# Patient Record
Sex: FEMALE | Race: WHITE | NOT HISPANIC OR LATINO
[De-identification: names, ages, dates, MRNs, and addresses within clinical notes are randomized per-mention and may not be internally consistent; named-entity substitution may affect disease eponyms.]

---

## 2017-11-29 ENCOUNTER — APPOINTMENT (OUTPATIENT)
Dept: PLASTIC SURGERY | Facility: CLINIC | Age: 42
End: 2017-11-29
Payer: COMMERCIAL

## 2017-12-06 ENCOUNTER — APPOINTMENT (OUTPATIENT)
Dept: PLASTIC SURGERY | Facility: CLINIC | Age: 42
End: 2017-12-06
Payer: COMMERCIAL

## 2017-12-06 PROCEDURE — 99024 POSTOP FOLLOW-UP VISIT: CPT

## 2018-04-18 ENCOUNTER — APPOINTMENT (OUTPATIENT)
Dept: PLASTIC SURGERY | Facility: CLINIC | Age: 43
End: 2018-04-18
Payer: COMMERCIAL

## 2018-04-25 ENCOUNTER — APPOINTMENT (OUTPATIENT)
Dept: PLASTIC SURGERY | Facility: CLINIC | Age: 43
End: 2018-04-25
Payer: SELF-PAY

## 2018-04-25 PROCEDURE — 99024 POSTOP FOLLOW-UP VISIT: CPT

## 2018-10-10 ENCOUNTER — APPOINTMENT (OUTPATIENT)
Dept: PLASTIC SURGERY | Facility: CLINIC | Age: 43
End: 2018-10-10
Payer: COMMERCIAL

## 2018-10-17 ENCOUNTER — APPOINTMENT (OUTPATIENT)
Dept: PLASTIC SURGERY | Facility: CLINIC | Age: 43
End: 2018-10-17
Payer: SELF-PAY

## 2018-10-17 PROCEDURE — 99024 POSTOP FOLLOW-UP VISIT: CPT

## 2019-04-15 ENCOUNTER — APPOINTMENT (OUTPATIENT)
Age: 44
End: 2019-04-15
Payer: COMMERCIAL

## 2019-04-22 ENCOUNTER — APPOINTMENT (OUTPATIENT)
Dept: PLASTIC SURGERY | Facility: CLINIC | Age: 44
End: 2019-04-22

## 2019-05-12 NOTE — PROCEDURE
[FreeTextEntry1] : Neil [FreeTextEntry2] : Botox [FreeTextEntry6] : Botox\par Glabella 20\par Forehead 10\par Crows feet 2 each\par \par 34 units tolerated well

## 2019-05-12 NOTE — REASON FOR VISIT
[Follow-Up: _____] : a [unfilled] follow-up visit [Friend] : friend [FreeTextEntry1] : Pt presents here for repeat botox.

## 2019-10-16 ENCOUNTER — APPOINTMENT (OUTPATIENT)
Dept: PLASTIC SURGERY | Facility: CLINIC | Age: 44
End: 2019-10-16
Payer: COMMERCIAL

## 2019-10-22 ENCOUNTER — APPOINTMENT (OUTPATIENT)
Dept: PLASTIC SURGERY | Facility: CLINIC | Age: 44
End: 2019-10-22
Payer: COMMERCIAL

## 2019-10-22 PROCEDURE — 99024 POSTOP FOLLOW-UP VISIT: CPT

## 2019-10-29 NOTE — REASON FOR VISIT
[Follow-Up: _____] : a [unfilled] follow-up visit [FreeTextEntry1] : Pt presents here for repeat Botox injections.

## 2019-10-29 NOTE — PROCEDURE
[FreeTextEntry1] : Neil [FreeTextEntry2] : Botox [FreeTextEntry6] : Botox\par Glabella 20 \par Forehead 10\par Crows feet 2 each\par \par 34 units tolerated well

## 2019-10-29 NOTE — PROCEDURE
[FreeTextEntry1] : Neil  [FreeTextEntry2] : Touch up Botox [FreeTextEntry6] : Botox to right forehead which did not take as well as left forehead

## 2020-07-13 ENCOUNTER — APPOINTMENT (OUTPATIENT)
Dept: PLASTIC SURGERY | Facility: CLINIC | Age: 45
End: 2020-07-13
Payer: SELF-PAY

## 2020-07-13 VITALS
TEMPERATURE: 97.5 F | HEIGHT: 66 IN | SYSTOLIC BLOOD PRESSURE: 123 MMHG | DIASTOLIC BLOOD PRESSURE: 73 MMHG | OXYGEN SATURATION: 96 % | HEART RATE: 86 BPM

## 2020-07-17 NOTE — PROCEDURE
[FreeTextEntry1] : Neil [FreeTextEntry2] : Botox [FreeTextEntry6] : Botox\par Glabella 20\par Forehead 10d\par Crows feet 2-3 each\par 34 units tolerated well

## 2020-07-31 ENCOUNTER — APPOINTMENT (OUTPATIENT)
Dept: PLASTIC SURGERY | Facility: CLINIC | Age: 45
End: 2020-07-31
Payer: COMMERCIAL

## 2020-07-31 VITALS
HEIGHT: 66 IN | BODY MASS INDEX: 18.32 KG/M2 | HEART RATE: 70 BPM | TEMPERATURE: 98 F | DIASTOLIC BLOOD PRESSURE: 67 MMHG | WEIGHT: 114 LBS | SYSTOLIC BLOOD PRESSURE: 115 MMHG | OXYGEN SATURATION: 100 %

## 2020-07-31 DIAGNOSIS — L98.8 OTHER SPECIFIED DISORDERS OF THE SKIN AND SUBCUTANEOUS TISSUE: ICD-10-CM

## 2020-08-24 PROBLEM — L98.8 FACIAL RHYTIDS: Status: ACTIVE | Noted: 2019-10-29

## 2020-12-07 ENCOUNTER — APPOINTMENT (OUTPATIENT)
Dept: PLASTIC SURGERY | Facility: CLINIC | Age: 45
End: 2020-12-07
Payer: SELF-PAY

## 2020-12-07 VITALS
TEMPERATURE: 98.5 F | HEART RATE: 87 BPM | DIASTOLIC BLOOD PRESSURE: 73 MMHG | OXYGEN SATURATION: 100 % | WEIGHT: 114 LBS | HEIGHT: 66 IN | SYSTOLIC BLOOD PRESSURE: 112 MMHG | BODY MASS INDEX: 18.32 KG/M2

## 2020-12-07 DIAGNOSIS — L98.8 OTHER SPECIFIED DISORDERS OF THE SKIN AND SUBCUTANEOUS TISSUE: ICD-10-CM

## 2020-12-07 PROCEDURE — 99024 POSTOP FOLLOW-UP VISIT: CPT

## 2020-12-14 ENCOUNTER — APPOINTMENT (OUTPATIENT)
Dept: PLASTIC SURGERY | Facility: CLINIC | Age: 45
End: 2020-12-14

## 2020-12-28 ENCOUNTER — APPOINTMENT (OUTPATIENT)
Dept: PLASTIC SURGERY | Facility: CLINIC | Age: 45
End: 2020-12-28
Payer: SELF-PAY

## 2020-12-28 VITALS
HEIGHT: 66 IN | DIASTOLIC BLOOD PRESSURE: 72 MMHG | WEIGHT: 112 LBS | BODY MASS INDEX: 18 KG/M2 | OXYGEN SATURATION: 100 % | TEMPERATURE: 98.4 F | SYSTOLIC BLOOD PRESSURE: 103 MMHG | HEART RATE: 82 BPM

## 2020-12-28 PROCEDURE — 99024 POSTOP FOLLOW-UP VISIT: CPT

## 2021-02-02 NOTE — PROCEDURE
[FreeTextEntry1] : joão [FreeTextEntry2] : botox [FreeTextEntry6] : Botox touch up to forehead, 6 units tolerated well

## 2021-02-02 NOTE — REASON FOR VISIT
[Follow-Up: _____] : a [unfilled] follow-up visit [FreeTextEntry1] : Pt is presents today for Botox injections.

## 2021-03-18 NOTE — REASON FOR VISIT
[Follow-Up: _____] : a [unfilled] follow-up visit [FreeTextEntry1] : Pt is present for repeat Botox injections.

## 2021-03-18 NOTE — PROCEDURE
[FreeTextEntry1] : Neil [FreeTextEntry2] : Botox [FreeTextEntry6] : Botox 6 units to forehead tolerated well